# Patient Record
Sex: FEMALE | Race: WHITE | NOT HISPANIC OR LATINO | URBAN - METROPOLITAN AREA
[De-identification: names, ages, dates, MRNs, and addresses within clinical notes are randomized per-mention and may not be internally consistent; named-entity substitution may affect disease eponyms.]

---

## 2023-06-17 ENCOUNTER — EMERGENCY (EMERGENCY)
Facility: HOSPITAL | Age: 48
LOS: 1 days | Discharge: HOME | End: 2023-06-17
Attending: STUDENT IN AN ORGANIZED HEALTH CARE EDUCATION/TRAINING PROGRAM
Payer: MEDICAID

## 2023-06-17 VITALS
TEMPERATURE: 98 F | DIASTOLIC BLOOD PRESSURE: 74 MMHG | RESPIRATION RATE: 18 BRPM | SYSTOLIC BLOOD PRESSURE: 146 MMHG | OXYGEN SATURATION: 99 % | HEART RATE: 84 BPM

## 2023-06-17 VITALS
WEIGHT: 207.23 LBS | DIASTOLIC BLOOD PRESSURE: 90 MMHG | RESPIRATION RATE: 18 BRPM | SYSTOLIC BLOOD PRESSURE: 150 MMHG | TEMPERATURE: 99 F | HEART RATE: 92 BPM | OXYGEN SATURATION: 100 % | HEIGHT: 62 IN

## 2023-06-17 DIAGNOSIS — N83.209 UNSPECIFIED OVARIAN CYST, UNSPECIFIED SIDE: ICD-10-CM

## 2023-06-17 LAB
ALBUMIN SERPL ELPH-MCNC: 3.7 G/DL — SIGNIFICANT CHANGE UP (ref 3.5–5)
ALP SERPL-CCNC: 60 U/L — SIGNIFICANT CHANGE UP (ref 40–120)
ALT FLD-CCNC: 20 U/L DA — SIGNIFICANT CHANGE UP (ref 10–60)
ANION GAP SERPL CALC-SCNC: 6 MMOL/L — SIGNIFICANT CHANGE UP (ref 5–17)
APPEARANCE UR: CLEAR — SIGNIFICANT CHANGE UP
AST SERPL-CCNC: 16 U/L — SIGNIFICANT CHANGE UP (ref 10–40)
BACTERIA # UR AUTO: ABNORMAL /HPF
BASOPHILS # BLD AUTO: 0.03 K/UL — SIGNIFICANT CHANGE UP (ref 0–0.2)
BASOPHILS NFR BLD AUTO: 0.3 % — SIGNIFICANT CHANGE UP (ref 0–2)
BILIRUB SERPL-MCNC: 0.3 MG/DL — SIGNIFICANT CHANGE UP (ref 0.2–1.2)
BILIRUB UR-MCNC: NEGATIVE — SIGNIFICANT CHANGE UP
BUN SERPL-MCNC: 9 MG/DL — SIGNIFICANT CHANGE UP (ref 7–18)
CALCIUM SERPL-MCNC: 9.3 MG/DL — SIGNIFICANT CHANGE UP (ref 8.4–10.5)
CHLORIDE SERPL-SCNC: 107 MMOL/L — SIGNIFICANT CHANGE UP (ref 96–108)
CO2 SERPL-SCNC: 27 MMOL/L — SIGNIFICANT CHANGE UP (ref 22–31)
COLOR SPEC: YELLOW — SIGNIFICANT CHANGE UP
CREAT SERPL-MCNC: 0.81 MG/DL — SIGNIFICANT CHANGE UP (ref 0.5–1.3)
DIFF PNL FLD: ABNORMAL
EGFR: 91 ML/MIN/1.73M2 — SIGNIFICANT CHANGE UP
EOSINOPHIL # BLD AUTO: 0.09 K/UL — SIGNIFICANT CHANGE UP (ref 0–0.5)
EOSINOPHIL NFR BLD AUTO: 0.8 % — SIGNIFICANT CHANGE UP (ref 0–6)
EPI CELLS # UR: ABNORMAL /HPF
GLUCOSE SERPL-MCNC: 99 MG/DL — SIGNIFICANT CHANGE UP (ref 70–99)
GLUCOSE UR QL: NEGATIVE — SIGNIFICANT CHANGE UP
HCG UR QL: NEGATIVE — SIGNIFICANT CHANGE UP
HCT VFR BLD CALC: 41.8 % — SIGNIFICANT CHANGE UP (ref 34.5–45)
HGB BLD-MCNC: 14 G/DL — SIGNIFICANT CHANGE UP (ref 11.5–15.5)
IMM GRANULOCYTES NFR BLD AUTO: 0.3 % — SIGNIFICANT CHANGE UP (ref 0–0.9)
KETONES UR-MCNC: NEGATIVE — SIGNIFICANT CHANGE UP
LEUKOCYTE ESTERASE UR-ACNC: NEGATIVE — SIGNIFICANT CHANGE UP
LIDOCAIN IGE QN: 98 U/L — SIGNIFICANT CHANGE UP (ref 73–393)
LYMPHOCYTES # BLD AUTO: 1.79 K/UL — SIGNIFICANT CHANGE UP (ref 1–3.3)
LYMPHOCYTES # BLD AUTO: 15.1 % — SIGNIFICANT CHANGE UP (ref 13–44)
MCHC RBC-ENTMCNC: 26.9 PG — LOW (ref 27–34)
MCHC RBC-ENTMCNC: 33.5 GM/DL — SIGNIFICANT CHANGE UP (ref 32–36)
MCV RBC AUTO: 80.4 FL — SIGNIFICANT CHANGE UP (ref 80–100)
MONOCYTES # BLD AUTO: 0.56 K/UL — SIGNIFICANT CHANGE UP (ref 0–0.9)
MONOCYTES NFR BLD AUTO: 4.7 % — SIGNIFICANT CHANGE UP (ref 2–14)
NEUTROPHILS # BLD AUTO: 9.37 K/UL — HIGH (ref 1.8–7.4)
NEUTROPHILS NFR BLD AUTO: 78.8 % — HIGH (ref 43–77)
NITRITE UR-MCNC: NEGATIVE — SIGNIFICANT CHANGE UP
NRBC # BLD: 0 /100 WBCS — SIGNIFICANT CHANGE UP (ref 0–0)
PH UR: 5 — SIGNIFICANT CHANGE UP (ref 5–8)
PLATELET # BLD AUTO: 323 K/UL — SIGNIFICANT CHANGE UP (ref 150–400)
POTASSIUM SERPL-MCNC: 4 MMOL/L — SIGNIFICANT CHANGE UP (ref 3.5–5.3)
POTASSIUM SERPL-SCNC: 4 MMOL/L — SIGNIFICANT CHANGE UP (ref 3.5–5.3)
PROT SERPL-MCNC: 8 G/DL — SIGNIFICANT CHANGE UP (ref 6–8.3)
PROT UR-MCNC: NEGATIVE — SIGNIFICANT CHANGE UP
RBC # BLD: 5.2 M/UL — SIGNIFICANT CHANGE UP (ref 3.8–5.2)
RBC # FLD: 13.1 % — SIGNIFICANT CHANGE UP (ref 10.3–14.5)
RBC CASTS # UR COMP ASSIST: ABNORMAL /HPF (ref 0–2)
SODIUM SERPL-SCNC: 140 MMOL/L — SIGNIFICANT CHANGE UP (ref 135–145)
SP GR SPEC: 1.01 — SIGNIFICANT CHANGE UP (ref 1.01–1.02)
UROBILINOGEN FLD QL: NEGATIVE — SIGNIFICANT CHANGE UP
WBC # BLD: 11.88 K/UL — HIGH (ref 3.8–10.5)
WBC # FLD AUTO: 11.88 K/UL — HIGH (ref 3.8–10.5)
WBC UR QL: SIGNIFICANT CHANGE UP /HPF (ref 0–5)

## 2023-06-17 PROCEDURE — 76705 ECHO EXAM OF ABDOMEN: CPT | Mod: 26

## 2023-06-17 PROCEDURE — 83690 ASSAY OF LIPASE: CPT

## 2023-06-17 PROCEDURE — 93975 VASCULAR STUDY: CPT

## 2023-06-17 PROCEDURE — 81001 URINALYSIS AUTO W/SCOPE: CPT

## 2023-06-17 PROCEDURE — 76856 US EXAM PELVIC COMPLETE: CPT

## 2023-06-17 PROCEDURE — 85025 COMPLETE CBC W/AUTO DIFF WBC: CPT

## 2023-06-17 PROCEDURE — 99285 EMERGENCY DEPT VISIT HI MDM: CPT

## 2023-06-17 PROCEDURE — 76705 ECHO EXAM OF ABDOMEN: CPT

## 2023-06-17 PROCEDURE — 76830 TRANSVAGINAL US NON-OB: CPT | Mod: 26

## 2023-06-17 PROCEDURE — 96374 THER/PROPH/DIAG INJ IV PUSH: CPT | Mod: XU

## 2023-06-17 PROCEDURE — 74177 CT ABD & PELVIS W/CONTRAST: CPT | Mod: MA

## 2023-06-17 PROCEDURE — 96361 HYDRATE IV INFUSION ADD-ON: CPT

## 2023-06-17 PROCEDURE — 93975 VASCULAR STUDY: CPT | Mod: 26

## 2023-06-17 PROCEDURE — 80053 COMPREHEN METABOLIC PANEL: CPT

## 2023-06-17 PROCEDURE — 99285 EMERGENCY DEPT VISIT HI MDM: CPT | Mod: 25

## 2023-06-17 PROCEDURE — 87086 URINE CULTURE/COLONY COUNT: CPT

## 2023-06-17 PROCEDURE — 74177 CT ABD & PELVIS W/CONTRAST: CPT | Mod: 26,MA

## 2023-06-17 PROCEDURE — 36415 COLL VENOUS BLD VENIPUNCTURE: CPT

## 2023-06-17 PROCEDURE — 76856 US EXAM PELVIC COMPLETE: CPT | Mod: 26

## 2023-06-17 PROCEDURE — 81025 URINE PREGNANCY TEST: CPT

## 2023-06-17 PROCEDURE — 76830 TRANSVAGINAL US NON-OB: CPT

## 2023-06-17 RX ORDER — KETOROLAC TROMETHAMINE 30 MG/ML
30 SYRINGE (ML) INJECTION ONCE
Refills: 0 | Status: DISCONTINUED | OUTPATIENT
Start: 2023-06-17 | End: 2023-06-17

## 2023-06-17 RX ORDER — SODIUM CHLORIDE 9 MG/ML
1000 INJECTION INTRAMUSCULAR; INTRAVENOUS; SUBCUTANEOUS ONCE
Refills: 0 | Status: COMPLETED | OUTPATIENT
Start: 2023-06-17 | End: 2023-06-17

## 2023-06-17 RX ADMIN — Medication 30 MILLIGRAM(S): at 17:41

## 2023-06-17 RX ADMIN — Medication 30 MILLIGRAM(S): at 17:11

## 2023-06-17 RX ADMIN — SODIUM CHLORIDE 1000 MILLILITER(S): 9 INJECTION INTRAMUSCULAR; INTRAVENOUS; SUBCUTANEOUS at 18:11

## 2023-06-17 RX ADMIN — SODIUM CHLORIDE 1000 MILLILITER(S): 9 INJECTION INTRAMUSCULAR; INTRAVENOUS; SUBCUTANEOUS at 17:11

## 2023-06-17 NOTE — CONSULT NOTE ADULT - SUBJECTIVE AND OBJECTIVE BOX
48 y/o female presents to the ED with diffuse, stabbing abdominal pain x2 days that occurs with drinking any liquid. GYN consulted for incidental left ovarian cyst finding on CT and Ultrasound. Pt unaware of cyst, last GYN work up approx 3 years ago.   Denies vaginal bleeding, vaginal discharge, chest pain, shortness of breath, dizziness, palpitations, n/v/d/c, fever, chills or any other complaints       Ob:   3 nsvds: 1) 1999 ft boy, 2) 2001 ft c/b bells palsy 3) 2006 ft  2 surgical TOPs, SABx3 (d&c x1) - approx 15 years ago  gyn: last saw GYN approx 3 years ago. denies known h/o cysts or fibroids. Denies STIs or abnl pap smears. Last pap smear approx 3 years ago nml per pt.  age at menarche 12 years old, gets periods q28 days with 3 days of bleeding. LMP 2023. Last sexually active 2 years ago  Pmhx: nonallergic rhinitis  Pshx: d&c x1, surgical eTOP x2  allergies: nka  meds: multivitamin      REVIEW OF SYSTEMS: see HPI	    PE:  Vital Signs Last 24 Hrs  T(C): 37.1 (2023 16:28), Max: 37.1 (2023 16:28)  T(F): 98.7 (2023 16:28), Max: 98.7 (2023 16:28)  HR: 92 (2023 16:28) (92 - 92)  BP: 150/90 (2023 16:28) (150/90 - 150/90)  BP(mean): --  RR: 18 (2023 16:28) (18 - 18)  SpO2: 100% (2023 16:28) (100% - 100%)    Parameters below as of 2023 16:28  Patient On (Oxygen Delivery Method): room air    Dr Rico present at bedside  Gen: A&Ox3, NAD, comfortable appearing, non-toxic, in no acute distress at this time  abd: +bs; soft, nt, nd, no rebound or guarding; no cvat b/l  pelvis: deferred     LABS:                        14.0   11.88 )-----------( 323      ( 2023 16:44 )             41.8         140  |  107  |  9   ----------------------------<  99  4.0   |  27  |  0.81    Ca    9.3      2023 16:44    TPro  8.0  /  Alb  3.7  /  TBili  0.3  /  DBili  x   /  AST  16  /  ALT  20  /  AlkPhos  60        Urinalysis Basic - ( 2023 16:44 )    Color: Yellow / Appearance: Clear / S.015 / pH: x  Gluc: x / Ketone: Negative  / Bili: Negative / Urobili: Negative   Blood: x / Protein: Negative / Nitrite: Negative   Leuk Esterase: Negative / RBC: 2-5 /HPF / WBC 0-2 /HPF   Sq Epi: x / Non Sq Epi: x / Bacteria: Few /HPF        RADIOLOGY & ADDITIONAL STUDIES:  < from: US Doppler Pelvis (23 @ 20:46) >  ACC: [id number] EXAM:  US TRANSVAGINAL   ORDERED BY: ANETTE HANKINS     ACC: [id number] EXAM:  US DPLX PELVIC   ORDERED BY: ANETTE HANKINS     ACC: [id number] EXAM:  US PELVIC COMPLETE   ORDERED BY: ANETTE HANKINS     PROCEDURE DATE:  2023          INTERPRETATION:  CLINICAL INFORMATION: Left pelvic pain    LMP: 2023    COMPARISON: None available.    TECHNIQUE: Transabdominal and transvaginal images of the pelvis are   submitted.    FINDINGS:  Uterus: 9.4 cm x 5.5 cm x 7.2 cm. Within normal limits.  Endometrium: 13 mm. Within normal limits.    Right ovary: 3.5 cm x 2.5 cm x 2.5 cm. Within normal limits. Normal   arterial and venous waveforms are obtained.  Left ovary: 6.6 cm x 4.0 cm x 5.4 cm.  There is a 5.6 cm complex septated   ovarian cyst. Normal arterial and venous waveforms are obtained.    Fluid: None.    IMPRESSION:  A 5.6 cm complex septated left ovarian cyst. Due to asymmetric left   ovarian enlargement, intermittent torsion/detorsion should be excluded on   clinical grounds.    --- End of Report ---            DANIELA SILVA MD; Attending Radiologist  This document has been electronically signed. 2023  9:36PM    < end of copied text >  < from: CT Abdomen and Pelvis w/ IV Cont (23 @ 18:51) >  ACC: [id number] EXAM:  CT ABDOMEN AND PELVIS IC   ORDERED BY: ANETTE HANKINS     PROCEDURE DATE:  2023          INTERPRETATION:  CLINICAL INFORMATION: Lower abdominal pain and tenderness    COMPARISON: None.    CONTRAST/COMPLICATIONS:  IV Contrast: Omnipaque 350  90 cc administered   10 cc discarded  Oral Contrast: NONE  Complications: None reported at time of study completion    PROCEDURE:  CT of the Abdomen and Pelvis was performed.  Sagittal and coronal reformats were performed.    FINDINGS:  LOWER CHEST: Partially visualized bilateral breast implants    LIVER: Low-attenuation lesion in the hepatic dome is incompletely   characterized on this study measuring approximately 1.7 cm. There is a   small area of nodular peripheral enhancement and this may represent a   hemangioma. Recommend dedicated ultrasound or dynamic CT or MR imaging   for better characterization  BILE DUCTS: Normal caliber.  GALLBLADDER: Within normal limits.  SPLEEN: Within normal limits.  PANCREAS: Within normal limits.  ADRENALS: Within normal limits.  KIDNEYS/URETERS: Within normal limits.    BLADDER: Within normal limits.  REPRODUCTIVE ORGANS: The uterus is unremarkable. There is a large left   adnexal cyst measuring up to 5.6 cm. Dominant follicle is seenin the   right ovary    BOWEL: No bowel obstruction. Appendix is normal.  PERITONEUM: No ascites.  VESSELS: Within normal limits.  RETROPERITONEUM/LYMPH NODES: No lymphadenopathy.  ABDOMINAL WALL: Small fat-containing umbilical hernia.  BONES: Mild degenerative changes of the spine, most notable at the L5-S1   level    IMPRESSION: Small hypodense lesion in the hepatic dome possibly   represents hemangioma but is incompletely characterized. Further   evaluation with ultrasound or dynamic CT or MR imaging is recommended  Large left adnexal cyst likely functional in this age group. Consider   further evaluation with pelvic ultrasound.    --- End of Report ---            JANUARY LEWIS MD; Attending Radiologist  This document has been electronically signed. 2023  7:15PM    < end of copied text >  < from: US Abdomen Upper Quadrant Right (23 @ 20:41) >  ACC: [id number] EXAM:  US ABDOMEN RT UPR QUADRANT   ORDERED BY: ANETTE HANKINS     PROCEDURE DATE:  2023          INTERPRETATION:  CLINICAL INFORMATION: Evaluate possible hemangioma,    COMPARISON: None available.    TECHNIQUE: Sonography of the right upper quadrant.    FINDINGS:  Liver: In the right lobe, there is an echogenic lesion measuring 1.9 x   2.8 cm, probably hemangioma.  Bile ducts: Normal caliber. Common bile duct measures 3 mm.  Gallbladder: Within normal limits.  Pancreas: Poorly visualized.  Right kidney: 10.7 cm. No hydronephrosis.  Ascites: None.  IVC: Visualized portions are within normal limits.    IMPRESSION:  A 2.8 cm echogenic lesion in the right lobe of the liver may represent   hemangioma. Recommend confirmation with MRI.    --- End of Report ---            DANIELA SILVA MD; Attending Radiologist  This document has been electronically signed. 2023  9:27PM    < end of copied text >      A/p: 48 y/o female with incidental finding of ovarian cyst. Pt stable   - there is no clinical indication for acute GYN intervention at this time  - patient given instructions to follow up with primary care regarding current symptoms, as well as recommendations to establish GYN care at [facility]  - return precautions discussed   - all questions answered, patient expressed     -d/w Dr. Rimarachin house attending

## 2023-06-17 NOTE — ED PROVIDER NOTE - NS ED ATTENDING STATEMENT MOD
This was a shared visit with the LETICIA. I reviewed and verified the documentation and independently performed the documented:

## 2023-06-17 NOTE — ED PROVIDER NOTE - PATIENT PORTAL LINK FT
You can access the FollowMyHealth Patient Portal offered by [facility] by registering at the following website: [email or web address]. By joining [facility]’s FollowMyHealth portal, you will also be able to view your health information using other applications (apps) compatible with our system.

## 2023-06-17 NOTE — ED ADULT NURSE NOTE - NSFALLUNIVINTERV_ED_ALL_ED
Bed/Stretcher in lowest position, wheels locked, appropriate side rails in place/Call bell, personal items and telephone in reach/Instruct patient to call for assistance before getting out of bed/chair/stretcher/Non-slip footwear applied when patient is off stretcher/[place] to call system/Physically safe environment - no spills, clutter or unnecessary equipment/Purposeful proactive rounding/Room/bathroom lighting operational, light cord in reach

## 2023-06-17 NOTE — ED PROVIDER NOTE - CLINICAL SUMMARY MEDICAL DECISION MAKING FREE TEXT BOX
47 year old female with b/l LQ abd tenderness, pain and distension. Will obtain labs, urine, CT abd pelvis to r/o small bowel obstruction, diverticulitis, colitis, appendicitis, or pelvic etiology such as ovarian torsion or ectopic pregnancy.

## 2023-06-17 NOTE — ED PROVIDER NOTE - NSFOLLOWUPINSTRUCTIONS_ED_ALL_ED_FT
Follow up with your primary care doctor within 1 week. They are recommending an MRI of the hemangioma in your liver and this will be coordinated with them.     Follow up with OBGYN within 1 week.     Follow up with Gastroenterologist within 2 weeks.     You can take motrin/tylenol as needed for pain.    If you experience any new or worsening symptoms or if you are concerned you can always come back to the emergency for a re-evaluation.

## 2023-06-17 NOTE — ED ADULT NURSE NOTE - CAS ELECT INFOMATION PROVIDED
Follow-up with Primary Care Provider, OBGYN Doctor, and Gastroenterologist. Return to the ED for new or worsening symptoms./DC instructions

## 2023-06-17 NOTE — ED PROVIDER NOTE - PROGRESS NOTE DETAILS
US with large 5.6 cm cyst. OBGYN consulted. OBGYN evaluated patient, will have patient follow up outpatient. Pt is well appearing walking with steady gait, stable for discharge and follow up without fail with medical doctor. I had a detailed discussion with the patient and/or guardian regarding the historical points, exam findings, and any diagnostic results supporting the discharge diagnosis. Pt educated on care and need for follow up. Strict return instructions and red flag signs and symptoms discussed with patient. Questions answered. Pt shows understanding of discharge information and agrees to follow.

## 2023-06-17 NOTE — CONSULT NOTE ADULT - PROBLEM SELECTOR RECOMMENDATION 9
After Visit Summary   8/2/2018    Kate Chacon    MRN: [id number]           Patient Information     Date Of Birth          1941        Visit Information        Provider Department      8/2/2018 11:45 AM Steve Fox MD [facility]        Today's Diagnoses     Urge incontinence of urine    -  1      Care Instructions    Home Care after Botox Treatment  Follow these guidelines for your care after your procedure.    Activity  No limitations    Bathing or showering  No limitations    Symptoms  You may notice some burning with urination but this usually resolves after 1-2 days.  You may also notice small amounts of blood in your urine.  Please increase water intake for the next few days to help with these symptoms.    Contacts  General Questions: [phone]  Appointments:  [phone]  Emergencies:  [phone]    When to call the clinic  If you develop any of the following symptoms please call the clinic immediately.  If the clinic is closed please be seen at an urgent care clinic or the Emergency Department.  - Burning with urination that worsens after 2 days  - Unable to urinate causing severe pelvic pain  - Fevers of greater than 101 degrees F  - Flank pain that is not responding to pain medication    Follow up  If you are currently taking an anticholinergic for urgency (such as oxybutynin, Detrol or Sanctura) please continue for 1 week then stop.  Please follow up in 6 weeks for a bladder scan.          Follow-ups after your visit        Your next 10 appointments already scheduled     Aug 02, 2018 11:45 AM CDT   PROCEDURE with Steve Fox MD   [facility]    [address]  [place] [id number] [phone]            Sep 13, 2018 10:15 AM CDT   Anticoagulation Visit with  ANTI COAG 2   [facility] ([facility])    [address] [place]  [phone]            Oct 22, 2018  1:45 PM CDT   Return Visit with JANAE Ferrell CNP   [facility] ([facility])    [address]  [place] [id number] [phone]              Who to contact     If you have questions or need follow up information about today's clinic visit or your schedule please contact [facility] directly at [phone].  Normal or non-critical lab and imaging results will be communicated to you by MyChart, letter or phone within 4 business days after the clinic has received the results. If you do not hear from us within 7 days, please contact the clinic through MyChart or phone. If you have a critical or abnormal lab result, we will notify you by phone as soon as possible.  Submit refill requests through [employer] or call your pharmacy and they will forward the refill request to us. Please allow 3 business days for your refill to be completed.          Additional Information About Your Visit        Care EveryWhere ID     This is your Care EveryWhere ID. This could be used by other organizations to access your [facility] medical records  [id number]        Your Vitals Were     Pulse Respirations Height BMI (Body Mass Index)          60 12 1.702 m (5' 7\") 30.45 kg/m2         Blood Pressure from Last 3 Encounters:   07/02/18 106/80   04/23/18 124/60   01/15/18 132/82    Weight from Last 3 Encounters:   08/02/18 88.2 kg (194 lb 6.4 oz)   07/26/18 88.2 kg (194 lb 6.4 oz)   07/02/18 87.2 kg (192 lb 3.2 oz)              We Performed the Following     *UA reflex to Microscopic     HC CYSTOURETHROSCOPY INJ CHEMODENERVATION BLADDER        Primary Care Provider Office Phone # Fax #    Asher Campos [phone]       [address] [place] [address]        Equal Access to Services     SCOTT GURROLA AH: Hadii noemi Babcock, waaxda scout, qaybta beronica joe, " hari galvinbrad tracy'aan ah. So [facility] [phone].    ATENCIÓN: Si josephine torres, tiene a fuentes disposición servicios gratuitos de asistencia lingüística. Héctor rivera [phone].    We comply with applicable federal civil rights laws and [place] laws. We do not discriminate on the basis of race, color, national origin, age, disability, sex, sexual orientation, or gender identity.            Thank you!     Thank you for choosing [facility]  for your care. Our goal is always to provide you with excellent care. Hearing back from our patients is one way we can continue to improve our services. Please take a few minutes to complete the written survey that you may receive in the mail after your visit with us. Thank you!             Your Updated Medication List - Protect others around you: Learn how to safely use, store and throw away your medicines at www.disposemymeds.org.          This list is accurate as of 8/2/18 11:31 AM.  Always use your most recent med list.                   Brand Name Dispense Instructions for use Diagnosis    atorvastatin 40 MG tablet    LIPITOR    90 tablet    Take 1 tablet (40 mg) by mouth At Bedtime    Hyperlipidemia, unspecified hyperlipidemia type       calcium carbonate 750 MG Chew      Take 1 tablet by mouth every 8 hours as needed for heartburn        ibuprofen 200 MG tablet    ADVIL/MOTRIN     Take 200 mg by mouth 4 times daily as needed for pain or headaches        lisinopril 20 MG tablet    PRINIVIL/ZESTRIL     Take 20 mg by mouth daily        metoprolol succinate 25 MG 24 hr tablet    TOPROL-XL    90 tablet    Take 1 tablet (25 mg) by mouth daily    Paroxysmal atrial fibrillation (H)       sertraline 50 MG tablet    ZOLOFT     Take 50 mg by mouth At Bedtime        warfarin 5 MG tablet    COUMADIN     TAKE 7.5 mg x 1 day and 5 mg x 6 days/week           - there is no clinical indication for acute GYN intervention at this time  - patient given instructions to follow up with primary care regarding current symptoms, as well as recommendations to establish GYN care at [facility]  - return precautions discussed   - all questions answered, patient expressed

## 2023-06-17 NOTE — ED ADULT NURSE NOTE - OBJECTIVE STATEMENT
AOX4 +ambulatory patient reports generalized abdominal pain, abdominal distention, headache, urinary frequency and constipation for 2 day.

## 2023-06-17 NOTE — ED PROVIDER NOTE - DATE/TIME 1
What Type Of Note Output Would You Prefer (Optional)?: Bullet Format
How Severe Is Your Skin Lesion?: mild
Has Your Skin Lesion Been Treated?: been treated
Is This A New Presentation, Or A Follow-Up?: Skin Lesion
When Was It Treated?: 2021
Additional History: Pt has spot on forehead that has been treated before with ln2 from past dermatologist a years ago. Still present and would like treated.
17-Jun-2023 21:41

## 2023-06-17 NOTE — ED PROVIDER NOTE - OBJECTIVE STATEMENT
47-year-old female with no past medical history presents with generalized abdominal pain, abdominal distention, headache, urinary frequency and constipation for 2 days.  Reports she took ibuprofen with no relief.  Patient reports that every time she eats or drinks she feels as if her abdomen is becoming more distended.  Denies nausea, vomiting, diarrhea, pain with urination

## 2023-06-19 LAB
CULTURE RESULTS: SIGNIFICANT CHANGE UP
SPECIMEN SOURCE: SIGNIFICANT CHANGE UP

## 2023-08-01 NOTE — ED ADULT NURSE NOTE - NS_NURSE_DISC_TEACHING_YN_ED_ALL_ED
allopurinol (ZYLOPRIM) 100 MG tablet take 1 tablet by mouth once daily  Qty: 90 tablet, Refills: 1      levothyroxine (SYNTHROID) 50 MCG tablet Take 1 tablet by mouth daily  Qty: 90 tablet, Refills: 1      albuterol (PROVENTIL) (5 MG/ML) 0.5% nebulizer solution Take 1 mL by nebulization 4 times daily as needed for Wheezing  Qty: 120 each, Refills: 3      albuterol sulfate HFA (VENTOLIN HFA) 108 (90 Base) MCG/ACT inhaler Inhale 2 puffs into the lungs 4 times daily as needed for Wheezing  Qty: 54 g, Refills: 1      calcitRIOL (ROCALTROL) 0.25 MCG capsule Take 1 capsule by mouth three times a week M w fri      cloZAPine (CLOZARIL) 50 MG tablet Take 1 tablet by mouth nightly      acetaminophen (TYLENOL) 325 MG tablet Take 2 tablets by mouth every 6 hours as needed for Pain or Fever      vitamin D (CHOLECALCIFEROL) 25 MCG (1000 UT) TABS tablet Take 1 tablet by mouth daily      cycloSPORINE (SANDIMMUNE) 100 MG capsule Take 1 capsule by mouth 2 times daily      folic acid (FOLVITE) 1 MG tablet Take 1 tablet by mouth daily      mycophenolate (CELLCEPT) 500 MG tablet Take 2 tablets by mouth 2 times daily           STOP taking these medications       azithromycin (ZITHROMAX) 250 MG tablet Comments:   Reason for Stopping:         vancomycin (VANCOCIN) 125 MG capsule Comments:   Reason for Stopping:         Probiotic Product (PROBIOTIC DAILY PO) Comments:   Reason for Stopping:         bacitracin-polymyxin b (POLYSPORIN) 500-57560 UNIT/GM ointment Comments:   Reason for Stopping:         simvastatin (ZOCOR) 20 MG tablet Comments:   Reason for Stopping:         docusate sodium (COLACE) 100 MG capsule Comments:   Reason for Stopping:         predniSONE (DELTASONE) 5 MG tablet Comments:   Reason for Stopping:                 Note that more than 30 minutes was spent in preparing discharge papers, discussing discharge with patient, medication review, etc.    NOTE: This report was transcribed using voice recognition software.  Every
Yes

## 2025-02-20 ENCOUNTER — APPOINTMENT (OUTPATIENT)
Age: 50
End: 2025-02-20
Payer: MEDICAID

## 2025-02-20 ENCOUNTER — NON-APPOINTMENT (OUTPATIENT)
Age: 50
End: 2025-02-20

## 2025-02-20 PROCEDURE — 92002 INTRM OPH EXAM NEW PATIENT: CPT
